# Patient Record
Sex: FEMALE | Race: BLACK OR AFRICAN AMERICAN | URBAN - METROPOLITAN AREA
[De-identification: names, ages, dates, MRNs, and addresses within clinical notes are randomized per-mention and may not be internally consistent; named-entity substitution may affect disease eponyms.]

---

## 2023-04-11 ENCOUNTER — WEB ENCOUNTER (OUTPATIENT)
Dept: URBAN - METROPOLITAN AREA CLINIC 50 | Facility: CLINIC | Age: 23
End: 2023-04-11

## 2023-04-11 ENCOUNTER — OFFICE VISIT (OUTPATIENT)
Dept: URBAN - METROPOLITAN AREA CLINIC 50 | Facility: CLINIC | Age: 23
End: 2023-04-11
Payer: COMMERCIAL

## 2023-04-11 VITALS
TEMPERATURE: 97.5 F | WEIGHT: 173 LBS | HEIGHT: 62 IN | HEART RATE: 87 BPM | DIASTOLIC BLOOD PRESSURE: 68 MMHG | SYSTOLIC BLOOD PRESSURE: 103 MMHG | BODY MASS INDEX: 31.83 KG/M2

## 2023-04-11 DIAGNOSIS — K59.00 CONSTIPATION, UNSPECIFIED CONSTIPATION TYPE: ICD-10-CM

## 2023-04-11 PROBLEM — 14760008: Status: ACTIVE | Noted: 2023-04-11

## 2023-04-11 PROCEDURE — 99204 OFFICE O/P NEW MOD 45 MIN: CPT

## 2023-04-11 NOTE — HPI-TODAY'S VISIT:
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

## 2023-04-17 ENCOUNTER — WEB ENCOUNTER (OUTPATIENT)
Dept: URBAN - METROPOLITAN AREA CLINIC 50 | Facility: CLINIC | Age: 23
End: 2023-04-17

## 2023-04-24 ENCOUNTER — WEB ENCOUNTER (OUTPATIENT)
Dept: URBAN - METROPOLITAN AREA CLINIC 50 | Facility: CLINIC | Age: 23
End: 2023-04-24

## 2023-05-15 ENCOUNTER — OFFICE VISIT (OUTPATIENT)
Dept: URBAN - METROPOLITAN AREA CLINIC 96 | Facility: CLINIC | Age: 23
End: 2023-05-15
Payer: COMMERCIAL

## 2023-05-15 ENCOUNTER — LAB OUTSIDE AN ENCOUNTER (OUTPATIENT)
Dept: URBAN - METROPOLITAN AREA CLINIC 96 | Facility: CLINIC | Age: 23
End: 2023-05-15

## 2023-05-15 VITALS
HEIGHT: 62 IN | DIASTOLIC BLOOD PRESSURE: 93 MMHG | WEIGHT: 158 LBS | BODY MASS INDEX: 29.08 KG/M2 | SYSTOLIC BLOOD PRESSURE: 131 MMHG | TEMPERATURE: 97.7 F

## 2023-05-15 DIAGNOSIS — K59.09 CHRONIC CONSTIPATION: ICD-10-CM

## 2023-05-15 PROBLEM — 440630006: Status: ACTIVE | Noted: 2023-05-15

## 2023-05-15 PROCEDURE — 99213 OFFICE O/P EST LOW 20 MIN: CPT | Performed by: INTERNAL MEDICINE

## 2023-05-15 RX ORDER — LUBIPROSTONE 24 UG/1
1 CAPSULE WITH FOOD AND WATER CAPSULE, GELATIN COATED ORAL TWICE A DAY
Qty: 60 | Refills: 3 | OUTPATIENT
Start: 2023-05-15 | End: 2023-09-12

## 2023-05-15 NOTE — HPI-TODAY'S VISIT:
She tried Linzess 72 and 145 - no help No help with trulance Ibsrela helped for a few days then stopped Has also tried lactuose before she is having 1 BM a week still has pain after many days of not going no BRBPR or melena the constipation has been for years - just getting progressively worse Also no help with OTC Miralax

## 2023-05-25 ENCOUNTER — WEB ENCOUNTER (OUTPATIENT)
Dept: URBAN - METROPOLITAN AREA CLINIC 96 | Facility: CLINIC | Age: 23
End: 2023-05-25

## 2023-05-25 RX ORDER — PRUCALOPRIDE 2 MG/1
1 TABLET TABLET, FILM COATED ORAL ONCE A DAY
Qty: 30 TABLET | Refills: 3 | OUTPATIENT
Start: 2023-05-25 | End: 2023-09-22

## 2023-06-06 ENCOUNTER — WEB ENCOUNTER (OUTPATIENT)
Dept: URBAN - METROPOLITAN AREA CLINIC 96 | Facility: CLINIC | Age: 23
End: 2023-06-06

## 2023-06-07 ENCOUNTER — ERX REFILL RESPONSE (OUTPATIENT)
Dept: URBAN - METROPOLITAN AREA CLINIC 96 | Facility: CLINIC | Age: 23
End: 2023-06-07

## 2023-06-07 RX ORDER — LUBIPROSTONE 24 UG/1
1 CAPSULE WITH FOOD AND WATER CAPSULE, GELATIN COATED ORAL TWICE A DAY
Qty: 60 | Refills: 3 | OUTPATIENT

## 2023-06-07 RX ORDER — LUBIPROSTONE 24 UG/1
1 CAPSULE WITH FOOD AND WATER ORALLY TWICE A DAY 30 DAYS CAPSULE, GELATIN COATED ORAL
Qty: 60 CAPSULE | Refills: 3 | OUTPATIENT

## 2023-06-19 ENCOUNTER — LAB OUTSIDE AN ENCOUNTER (OUTPATIENT)
Dept: URBAN - METROPOLITAN AREA CLINIC 96 | Facility: CLINIC | Age: 23
End: 2023-06-19

## 2023-07-03 ENCOUNTER — WEB ENCOUNTER (OUTPATIENT)
Dept: URBAN - METROPOLITAN AREA CLINIC 96 | Facility: CLINIC | Age: 23
End: 2023-07-03

## 2023-07-03 RX ORDER — LACTULOSE 10 G/15ML
15 ML AS NEEDED SOLUTION ORAL ONCE A DAY
Qty: 450 | OUTPATIENT
Start: 2023-07-11 | End: 2023-08-10

## 2023-07-11 ENCOUNTER — TELEPHONE ENCOUNTER (OUTPATIENT)
Dept: URBAN - METROPOLITAN AREA CLINIC 96 | Facility: CLINIC | Age: 23
End: 2023-07-11

## 2023-08-18 ENCOUNTER — DASHBOARD ENCOUNTERS (OUTPATIENT)
Age: 23
End: 2023-08-18

## 2023-08-18 ENCOUNTER — CLAIMS CREATED FROM THE CLAIM WINDOW (OUTPATIENT)
Dept: URBAN - METROPOLITAN AREA TELEHEALTH 2 | Facility: TELEHEALTH | Age: 23
End: 2023-08-18
Payer: COMMERCIAL

## 2023-08-18 VITALS — HEIGHT: 62 IN | BODY MASS INDEX: 29.44 KG/M2 | WEIGHT: 160 LBS

## 2023-08-18 DIAGNOSIS — K58.1 IRRITABLE BOWEL SYNDROME WITH CONSTIPATION: ICD-10-CM

## 2023-08-18 PROCEDURE — 99213 OFFICE O/P EST LOW 20 MIN: CPT | Performed by: PHYSICIAN ASSISTANT

## 2023-08-18 PROCEDURE — 99213 OFFICE O/P EST LOW 20 MIN: CPT | Performed by: INTERNAL MEDICINE

## 2023-08-18 RX ORDER — LUBIPROSTONE 24 UG/1
1 CAPSULE WITH FOOD AND WATER ORALLY TWICE A DAY 30 DAYS CAPSULE, GELATIN COATED ORAL
Qty: 60 CAPSULE | Refills: 3 | Status: ACTIVE | COMMUNITY

## 2023-08-18 RX ORDER — PRUCALOPRIDE 2 MG/1
1 TABLET TABLET, FILM COATED ORAL ONCE A DAY
Qty: 30 TABLET | Refills: 3 | Status: ACTIVE | COMMUNITY
Start: 2023-05-25 | End: 2023-09-22

## 2023-08-18 NOTE — HPI-TODAY'S VISIT:
Pt tried the lactulose and it did not work no help in past with lactulose, linzess, trulance, Ibsrelea, amitiza -- and motegrity helped slightlybut not a lot - tried it for one month - was very expensive though still has constipation - 1 BM q9 days for ducolax to work has to take 6 pills  all the meds work for 3-4 days and then stop working  was only taking it once a day no help with lactulose in past more than once a day she did sitz marker study and only got the xray 5 days after ingesting the pill - no signs left of any tracers - she thinks she had a BM during that time - she cannot remember

## 2025-06-27 ENCOUNTER — APPOINTMENT (OUTPATIENT)
Dept: URBAN - METROPOLITAN AREA CLINIC 208 | Age: 25
Setting detail: DERMATOLOGY
End: 2025-06-30

## 2025-06-27 DIAGNOSIS — L70.0 ACNE VULGARIS: ICD-10-CM

## 2025-06-27 DIAGNOSIS — L81.1 CHLOASMA: ICD-10-CM

## 2025-06-27 PROCEDURE — OTHER COUNSELING: OTHER

## 2025-06-27 PROCEDURE — OTHER PRESCRIPTION MEDICATION MANAGEMENT: OTHER

## 2025-06-27 PROCEDURE — OTHER MIPS QUALITY: OTHER

## 2025-06-27 PROCEDURE — OTHER PRESCRIPTION: OTHER

## 2025-06-27 PROCEDURE — OTHER ADDITIONAL NOTES: OTHER

## 2025-06-27 RX ORDER — TRETIONIN 0.5 MG/G
CREAM TOPICAL QHS
Qty: 45 | Refills: 6 | Status: ERX | COMMUNITY
Start: 2025-06-27

## 2025-06-27 RX ORDER — HYDROQUINONE 40 MG/G
CREAM TOPICAL BID
Qty: 28.35 | Refills: 4 | Status: ERX | COMMUNITY
Start: 2025-06-27

## 2025-06-27 ASSESSMENT — LOCATION DETAILED DESCRIPTION DERM
LOCATION DETAILED: NASAL SUPRATIP
LOCATION DETAILED: LEFT CENTRAL MALAR CHEEK
LOCATION DETAILED: RIGHT MEDIAL MALAR CHEEK

## 2025-06-27 ASSESSMENT — LOCATION SIMPLE DESCRIPTION DERM
LOCATION SIMPLE: LEFT CHEEK
LOCATION SIMPLE: RIGHT CHEEK
LOCATION SIMPLE: NOSE

## 2025-06-27 ASSESSMENT — LOCATION ZONE DERM
LOCATION ZONE: NOSE
LOCATION ZONE: FACE